# Patient Record
Sex: FEMALE | Race: WHITE | NOT HISPANIC OR LATINO | Employment: FULL TIME | URBAN - METROPOLITAN AREA
[De-identification: names, ages, dates, MRNs, and addresses within clinical notes are randomized per-mention and may not be internally consistent; named-entity substitution may affect disease eponyms.]

---

## 2023-08-31 LAB
C REACTIVE PROTEIN (MG/L) IN SER/PLAS: 0.27 MG/DL
THYROTROPIN (MIU/L) IN SER/PLAS BY DETECTION LIMIT <= 0.05 MIU/L: 1.08 MIU/L (ref 0.44–3.98)
TISSUE TRANSGLUTAMINASE, IGA: <1 U/ML (ref 0–14)

## 2023-09-09 LAB
C. DIFFICILE TOXIN, PCR: NOT DETECTED
CAMPYLOBACTER GP: NOT DETECTED
NOROVIRUS GI/GII: NOT DETECTED
ROTAVIRUS A: NOT DETECTED
SALMONELLA SP.: NOT DETECTED
SHIGA TOXIN 1: NOT DETECTED
SHIGA TOXIN 2: NOT DETECTED
SHIGELLA SP.: NOT DETECTED
VIBRIO GRP.: NOT DETECTED
YERSINIA ENTEROCOLITICA: NOT DETECTED

## 2023-09-13 LAB — CALPROTECTIN, STOOL: 152 UG/G

## 2023-10-17 ENCOUNTER — ANESTHESIA EVENT (OUTPATIENT)
Dept: GASTROENTEROLOGY | Facility: EXTERNAL LOCATION | Age: 23
End: 2023-10-17

## 2023-10-26 PROBLEM — K60.2 ANAL FISSURE: Status: ACTIVE | Noted: 2023-10-26

## 2023-10-26 PROBLEM — K21.9 GERD (GASTROESOPHAGEAL REFLUX DISEASE): Status: ACTIVE | Noted: 2023-10-26

## 2023-10-26 PROBLEM — K52.9 CHRONIC DIARRHEA OF UNKNOWN ORIGIN: Status: ACTIVE | Noted: 2023-10-26

## 2023-10-26 RX ORDER — AMOXICILLIN 250 MG
1 CAPSULE ORAL AS NEEDED
COMMUNITY

## 2023-10-26 RX ORDER — FAMOTIDINE 20 MG/1
20 TABLET, FILM COATED ORAL AS NEEDED
COMMUNITY
End: 2023-10-27 | Stop reason: ALTCHOICE

## 2023-10-26 RX ORDER — DICYCLOMINE HYDROCHLORIDE 10 MG/1
10 CAPSULE ORAL AS NEEDED
COMMUNITY

## 2023-10-26 RX ORDER — ONDANSETRON 4 MG/1
4 TABLET, ORALLY DISINTEGRATING ORAL EVERY 8 HOURS PRN
COMMUNITY

## 2023-10-26 RX ORDER — LEVONORGESTREL AND ETHINYL ESTRADIOL 0.15-0.03
1 KIT ORAL DAILY
COMMUNITY

## 2023-10-26 RX ORDER — OMEPRAZOLE 20 MG/1
20 CAPSULE, DELAYED RELEASE ORAL DAILY
COMMUNITY

## 2023-10-27 ENCOUNTER — ANESTHESIA (OUTPATIENT)
Dept: GASTROENTEROLOGY | Facility: EXTERNAL LOCATION | Age: 23
End: 2023-10-27

## 2023-10-27 ENCOUNTER — HOSPITAL ENCOUNTER (OUTPATIENT)
Dept: GASTROENTEROLOGY | Facility: EXTERNAL LOCATION | Age: 23
Discharge: HOME | End: 2023-10-27
Payer: COMMERCIAL

## 2023-10-27 ENCOUNTER — PREP FOR PROCEDURE (OUTPATIENT)
Dept: GASTROENTEROLOGY | Facility: EXTERNAL LOCATION | Age: 23
End: 2023-10-27

## 2023-10-27 VITALS
SYSTOLIC BLOOD PRESSURE: 118 MMHG | HEIGHT: 60 IN | RESPIRATION RATE: 17 BRPM | TEMPERATURE: 98.1 F | WEIGHT: 125 LBS | HEART RATE: 79 BPM | OXYGEN SATURATION: 100 % | DIASTOLIC BLOOD PRESSURE: 78 MMHG | BODY MASS INDEX: 24.54 KG/M2

## 2023-10-27 DIAGNOSIS — R10.13 EPIGASTRIC PAIN: Primary | ICD-10-CM

## 2023-10-27 DIAGNOSIS — K52.9 NONINFECTIVE GASTROENTERITIS AND COLITIS, UNSPECIFIED: ICD-10-CM

## 2023-10-27 DIAGNOSIS — R11.0 NAUSEA: ICD-10-CM

## 2023-10-27 DIAGNOSIS — R19.7 DIARRHEA, UNSPECIFIED TYPE: ICD-10-CM

## 2023-10-27 DIAGNOSIS — K21.9 GASTRO-ESOPHAGEAL REFLUX DISEASE WITHOUT ESOPHAGITIS: ICD-10-CM

## 2023-10-27 LAB — PREGNANCY TEST URINE, POC: NEGATIVE

## 2023-10-27 PROCEDURE — 45378 DIAGNOSTIC COLONOSCOPY: CPT | Performed by: INTERNAL MEDICINE

## 2023-10-27 PROCEDURE — 88305 TISSUE EXAM BY PATHOLOGIST: CPT | Performed by: PATHOLOGY

## 2023-10-27 PROCEDURE — 43239 EGD BIOPSY SINGLE/MULTIPLE: CPT | Performed by: INTERNAL MEDICINE

## 2023-10-27 PROCEDURE — 88305 TISSUE EXAM BY PATHOLOGIST: CPT

## 2023-10-27 RX ORDER — SODIUM CHLORIDE 9 MG/ML
20 INJECTION, SOLUTION INTRAVENOUS CONTINUOUS
Status: CANCELLED | OUTPATIENT
Start: 2023-10-27

## 2023-10-27 RX ORDER — SODIUM CHLORIDE 9 MG/ML
20 INJECTION, SOLUTION INTRAVENOUS CONTINUOUS
Status: DISCONTINUED | OUTPATIENT
Start: 2023-10-27 | End: 2023-10-28 | Stop reason: HOSPADM

## 2023-10-27 RX ORDER — PROPOFOL 10 MG/ML
INJECTION, EMULSION INTRAVENOUS AS NEEDED
Status: DISCONTINUED | OUTPATIENT
Start: 2023-10-27 | End: 2023-10-27

## 2023-10-27 RX ORDER — LIDOCAINE HYDROCHLORIDE 20 MG/ML
INJECTION, SOLUTION INFILTRATION; PERINEURAL AS NEEDED
Status: DISCONTINUED | OUTPATIENT
Start: 2023-10-27 | End: 2023-10-27

## 2023-10-27 RX ADMIN — PROPOFOL 50 MG: 10 INJECTION, EMULSION INTRAVENOUS at 10:44

## 2023-10-27 RX ADMIN — PROPOFOL 50 MG: 10 INJECTION, EMULSION INTRAVENOUS at 10:42

## 2023-10-27 RX ADMIN — SODIUM CHLORIDE: 9 INJECTION, SOLUTION INTRAVENOUS at 10:35

## 2023-10-27 RX ADMIN — LIDOCAINE HYDROCHLORIDE 5 ML: 20 INJECTION, SOLUTION INFILTRATION; PERINEURAL at 10:40

## 2023-10-27 RX ADMIN — PROPOFOL 50 MG: 10 INJECTION, EMULSION INTRAVENOUS at 10:47

## 2023-10-27 RX ADMIN — PROPOFOL 50 MG: 10 INJECTION, EMULSION INTRAVENOUS at 10:56

## 2023-10-27 RX ADMIN — PROPOFOL 50 MG: 10 INJECTION, EMULSION INTRAVENOUS at 10:50

## 2023-10-27 RX ADMIN — PROPOFOL 50 MG: 10 INJECTION, EMULSION INTRAVENOUS at 10:40

## 2023-10-27 SDOH — HEALTH STABILITY: MENTAL HEALTH: CURRENT SMOKER: 0

## 2023-10-27 ASSESSMENT — PAIN - FUNCTIONAL ASSESSMENT
PAIN_FUNCTIONAL_ASSESSMENT: 0-10

## 2023-10-27 ASSESSMENT — PAIN SCALES - GENERAL
PAINLEVEL_OUTOF10: 0 - NO PAIN
PAIN_LEVEL: 0

## 2023-10-27 ASSESSMENT — COLUMBIA-SUICIDE SEVERITY RATING SCALE - C-SSRS
6. HAVE YOU EVER DONE ANYTHING, STARTED TO DO ANYTHING, OR PREPARED TO DO ANYTHING TO END YOUR LIFE?: NO
1. IN THE PAST MONTH, HAVE YOU WISHED YOU WERE DEAD OR WISHED YOU COULD GO TO SLEEP AND NOT WAKE UP?: NO
2. HAVE YOU ACTUALLY HAD ANY THOUGHTS OF KILLING YOURSELF?: NO

## 2023-10-27 NOTE — ANESTHESIA PREPROCEDURE EVALUATION
Patient: Yen Castellanos    Procedure Information       Date/Time: 10/27/23 1020    Scheduled providers: Willam Nicole MD; Maggie Vazquez RN    Procedures:       COLONOSCOPY      EGD    Location: [facility]            Relevant Problems   GI   (+) Chronic diarrhea of unknown origin   (+) GERD (gastroesophageal reflux disease)       Clinical information reviewed:   Tobacco  Allergies  Meds   Med Hx  Surg Hx  OB Status  Fam Hx  Soc   Hx        NPO Detail:  NPO/Void Status  Carbonhydrate Drink Given Prior to Surgery? : N  Date of Last Liquid: 10/27/23  Time of Last Liquid: 0445  Date of Last Solid: 10/25/23  Time of Last Solid: 2100  Last Intake Type: Clear fluids  Time of Last Void: 1017         Physical Exam    Airway  Mallampati: II  TM distance: >3 FB  Neck ROM: full     Cardiovascular - normal exam  Rhythm: regular  Rate: normal     Dental - normal exam     Pulmonary - normal exam  Breath sounds clear to auscultation     Abdominal - normal exam         Anesthesia Plan    ASA 2     MAC     The patient is not a current smoker.    intravenous induction   Anesthetic plan and risks discussed with patient.  Use of blood products discussed with who consented to blood products.    Plan discussed with CRNA.

## 2023-10-27 NOTE — Clinical Note
Patient tolerated the procedure well and is comfortable with no complaints of pain. Abdomen soft and non distended. Vital signs stable. Arousable prior to transport. Patient transported to PACU via stretcher. Handoff completed.

## 2023-10-27 NOTE — H&P
Procedure H&P    Patient Profile-Procedures  Name Yen Gunter  Date of Birth 2000  MRN [id number]  Address   [address] [place] [id number] [place] [address]    Primary Phone Number [phone]  Secondary Phone Number    [facility] Calvin, Debbie Lynn    Procedure(s):  Procedures: EGD and Colonoscopy  Primary contact name and number   Extended Emergency Contact Information  Primary Emergency Contact: gwyn gunter  Work Phone: [phone]  Relation: Spouse  Preferred language: English   needed? No    General Health  Weight   Vitals:    10/27/23 1015   Weight: 56.7 kg (125 lb)     BMI Body mass index is 24.41 kg/m².    Allergies  Allergies   Allergen Reactions    Codeine Unknown    Penicillins Unknown    Sutab [Sod Sulf-Pot Chloride-Mag Sulf] Hives and Itching       Past Medical History   Past Medical History:   Diagnosis Date    Congenital adrenal hyperplasia (CMS/HCC)     GERD (gastroesophageal reflux disease)        Provider assessment  Diagnosis:  nausea abdominal pain diarrhea  and GERD  Medication Reviewed - yes  Prior to Admission medications    Medication Sig Start Date End Date Taking? Authorizing Provider   omeprazole (PriLOSEC) 20 mg DR capsule Take 1 capsule (20 mg) by mouth once daily.   Yes Historical Provider, MD   dicyclomine (Bentyl) 10 mg capsule Take 1 capsule (10 mg) by mouth if needed.    Historical Provider, MD   levonorgestreL-ethinyl estrad (Seasonale) 0.15 mg-30 mcg (91) tablet Take 1 tablet by mouth once daily.    Historical Provider, MD   NON FORMULARY Compound drug- Nifedipine ointment .2% with lidocaine.  Apply to affected area twice a day for 4 weeks    Historical Provider, MD   ondansetron ODT (Zofran-ODT) 4 mg disintegrating tablet Take 1 tablet (4 mg) by mouth every 8 hours if needed.    Historical Provider, MD   sennosides-docusate sodium (Senna-S) 8.6-50 mg tablet Take 1 tablet by mouth if needed for constipation.    Historical Provider, MD   sod  sulf-pot chloride-mag sulf 1.479-0.188- 0.225 gram tablet Take as directed    Historical Provider, MD   famotidine (Pepcid) 20 mg tablet Take 1 tablet (20 mg) by mouth if needed for heartburn.  10/27/23  Historical Provider, MD       Physical Exam  Vitals:    10/27/23 1015   BP: 138/89   Pulse: 93   Resp: 13   Temp: 36.2 °C (97.2 °F)   SpO2: 99%        General: A&Ox3, NAD.  HEENT: AT/NC.   CV: RRR. No murmur.  Resp: CTA bilaterally. No wheezing, rhonchi or rales.   GI: Soft, NT/ND. BSx4.  Extrem: No edema. Pulses intact.  Skin: No Jaundice.   Neuro: No focal deficits.   Psych: Normal mood and affect.      Procedure Plan - pre-procedural (re)assesment completed by physician:  discharge/transfer patient when discharge criteria met    Willam Nicole MD  10/27/2023 10:37 AM

## 2023-10-27 NOTE — DISCHARGE INSTRUCTIONS
Patient Instructions after a Colonoscopy    The anesthetics, sedatives or narcotics which were given to you today will be acting in your body for the next 24 hours, so you might feel a little sleepy or groggy.  This feeling should slowly wear off. Carefully read and follow the instructions.     You received sedation today:  - Do not drive or operate any machinery or power tools of any kind.   - No alcoholic beverages today, not even beer or wine.  - Do not make any important decisions or sign any legal documents.  - No over the counter medications that contain alcohol or that may cause drowsiness.    While it is common to experience mild to moderate abdominal distention, gas, or belching after your procedure, if any of these symptoms occur following discharge from the GI Lab or within one week of having your procedure, call the [facility] to be advised whether a visit to your nearest Urgent Care or Emergency Department is indicated.  Take this paper with you if you go.     - If you develop an allergic reaction to the medications that were given during your procedure such as difficulty breathing, rash, hives, severe nausea, vomiting or lightheadedness.  - If you experience chest pain, shortness of breath, severe abdominal pain, fevers and chills.  -If you develop signs and symptoms of bleeding such as blood in your spit, if your stools turn black, tarry, or bloody  - If you have not urinated within 8 hours following your procedure.  - If your IV site becomes painful, red, inflamed, or looks infected.    If you received a biopsy/polypectomy/sphincterotomy the following instructions apply below:    __ Do not use Aspirin containing products, non-steroidal medications or anti-coagulants for one week following your procedure. (Examples of these types of medications are: Advil, Arthrotec, Aleve, Coumadin, Ecotrin, Heparin, Ibuprofen, Indocin, Motrin, Naprosyn, Nuprin, Plavix, Vioxx, and Voltarin, or their  generic forms.  This list is not all-inclusive.  Check with your physician or pharmacist before resuming medications.)   __ Eat a soft diet today.  Avoid foods that are poorly digested for the next 24 hours.  These foods would include: nuts, beans, lettuce, red meats, and fried foods. Start with liquids and advance your diet as tolerated, gradually work up to eating solids.   __ Do not have a Barium Study or Enema for one week.    Your physician recommends the additional following instructions:    -You have a contact number available for emergencies. The signs and symptoms of potential delayed complications were discussed with you. You may return to normal activities tomorrow.  -Resume your previous diet.  -Continue your present medications.   -We are waiting for your pathology results.  -Your physician has recommended a repeat colonoscopy (date to be determined after pending pathology results are reviewed) for surveillance based on pathology results.  -The findings and recommendations have been discussed with you.  -The findings and recommendations were discussed with your family.  - Please see Medication Reconciliation Form for new medication/medications prescribed.     If you experience any problems or have any questions following discharge from the GI Lab, please call: [phone]

## 2023-11-13 ENCOUNTER — TELEPHONE (OUTPATIENT)
Dept: GASTROENTEROLOGY | Facility: HOSPITAL | Age: 23
End: 2023-11-13
Payer: COMMERCIAL

## 2023-11-16 LAB
LABORATORY COMMENT REPORT: NORMAL
PATH REPORT.FINAL DX SPEC: NORMAL
PATH REPORT.GROSS SPEC: NORMAL
PATH REPORT.RELEVANT HX SPEC: NORMAL
PATH REPORT.TOTAL CANCER: NORMAL

## 2023-11-20 ENCOUNTER — OFFICE VISIT (OUTPATIENT)
Dept: GASTROENTEROLOGY | Facility: CLINIC | Age: 23
End: 2023-11-20
Payer: COMMERCIAL

## 2023-11-20 VITALS
DIASTOLIC BLOOD PRESSURE: 85 MMHG | HEART RATE: 84 BPM | TEMPERATURE: 97.4 F | SYSTOLIC BLOOD PRESSURE: 136 MMHG | BODY MASS INDEX: 25.72 KG/M2 | WEIGHT: 131 LBS | HEIGHT: 60 IN

## 2023-11-20 DIAGNOSIS — K21.9 GASTROESOPHAGEAL REFLUX DISEASE, UNSPECIFIED WHETHER ESOPHAGITIS PRESENT: Primary | ICD-10-CM

## 2023-11-20 DIAGNOSIS — K52.9 CHRONIC DIARRHEA: ICD-10-CM

## 2023-11-20 PROCEDURE — 1036F TOBACCO NON-USER: CPT | Performed by: NURSE PRACTITIONER

## 2023-11-20 PROCEDURE — 99214 OFFICE O/P EST MOD 30 MIN: CPT | Performed by: NURSE PRACTITIONER

## 2023-11-20 RX ORDER — FAMOTIDINE 40 MG/1
40 TABLET, FILM COATED ORAL NIGHTLY PRN
Qty: 60 TABLET | Refills: 0 | Status: SHIPPED | OUTPATIENT
Start: 2023-11-20 | End: 2024-01-19

## 2023-11-20 ASSESSMENT — ENCOUNTER SYMPTOMS
EYES NEGATIVE: 1
PSYCHIATRIC NEGATIVE: 1
HEMATOLOGIC/LYMPHATIC NEGATIVE: 1
NEUROLOGICAL NEGATIVE: 1
CARDIOVASCULAR NEGATIVE: 1
DIAPHORESIS: 0
MUSCULOSKELETAL NEGATIVE: 1
ROS GI COMMENTS: SEE HPI
DIFFICULTY URINATING: 0
FEVER: 0
STRIDOR: 0
CHEST TIGHTNESS: 0
SHORTNESS OF BREATH: 0
ENDOCRINE NEGATIVE: 1
RESPIRATORY NEGATIVE: 1
WHEEZING: 0
ALLERGIC/IMMUNOLOGIC NEGATIVE: 1
COUGH: 0
APNEA: 0
FATIGUE: 0
CHILLS: 0

## 2023-11-20 NOTE — PROGRESS NOTES
Subjective   Patient ID: Yen Castellanos is a 23 y.o. female who presents for Results.      SHe is having a BM 2-3 times/day, mostly in the morning or late at night. Never wakes from sleep. She denies rectal bleeding, no dark tarry stools. SHe reports pain with a BM. Family Hx: P Uncle with CrohnsP. Grandfather with CRC @ 60sP. Uncle with CRC @ 50sShe denies a family hx of Celiac         Review of Systems   Constitutional:  Negative for chills, diaphoresis, fatigue and fever.   HENT: Negative.     Eyes: Negative.    Respiratory: Negative.  Negative for apnea, cough, chest tightness, shortness of breath, wheezing and stridor.    Cardiovascular: Negative.    Gastrointestinal:         See HPI    Endocrine: Negative.    Genitourinary: Negative.  Negative for difficulty urinating.   Musculoskeletal: Negative.    Skin: Negative.    Allergic/Immunologic: Negative.    Neurological: Negative.    Hematological: Negative.    Psychiatric/Behavioral: Negative.         Objective   Physical Exam  Constitutional:       Appearance: She is normal weight.   Neurological:      Mental Status: She is alert.   Psychiatric:         Mood and Affect: Mood normal.         Assessment/Plan   Diagnoses and all orders for this visit:  Gastroesophageal reflux disease, unspecified whether esophagitis present  -     famotidine (Pepcid) 40 mg tablet; Take 1 tablet (40 mg) by mouth as needed at bedtime for heartburn.  Chronic diarrhea  -     Breath Hydrogen Test-Bacterial Overgrowth; Future  -     Calprotectin, Fecal; Future    T his is a 22 year old female with no significant PMH who presents today for follow up procedures. She underwent an EGD and Colonoscopy. EGD showed peptic duodenitis. She is doing well with Omeprazole but does have reflux at HS. She still has diarrhea. Fecal calprotectin was slightly elevated. She will recheck this and if remains elevated will complete a CTE, if normal she will complete breath test to r/o SIBO.

## 2023-11-20 NOTE — PATIENT INSTRUCTIONS
Complete stool test    Add famotidine at bedtime    Call if still having symptoms    Check stool test    We will either do a breath test or CT scan    Follow up in 1-2 months (virtual)

## 2024-04-03 ENCOUNTER — LAB (OUTPATIENT)
Dept: LAB | Facility: LAB | Age: 24
End: 2024-04-03
Payer: COMMERCIAL

## 2024-04-03 DIAGNOSIS — K52.9 CHRONIC DIARRHEA: ICD-10-CM

## 2024-04-03 PROCEDURE — 83993 ASSAY FOR CALPROTECTIN FECAL: CPT

## 2024-04-07 LAB — CALPROTECTIN STL-MCNT: 52 UG/G

## 2024-08-21 ENCOUNTER — TELEMEDICINE (OUTPATIENT)
Dept: PRIMARY CARE | Facility: CLINIC | Age: 24
End: 2024-08-21
Payer: COMMERCIAL

## 2024-08-21 DIAGNOSIS — R05.2 SUBACUTE COUGH: ICD-10-CM

## 2024-08-21 DIAGNOSIS — J06.9 UPPER RESPIRATORY TRACT INFECTION, UNSPECIFIED TYPE: Primary | ICD-10-CM

## 2024-08-21 PROCEDURE — 99213 OFFICE O/P EST LOW 20 MIN: CPT

## 2024-08-21 RX ORDER — BENZONATATE 100 MG/1
100 CAPSULE ORAL 3 TIMES DAILY PRN
Qty: 42 CAPSULE | Refills: 0 | Status: SHIPPED | OUTPATIENT
Start: 2024-08-21 | End: 2024-09-20

## 2024-08-21 RX ORDER — AZITHROMYCIN 500 MG/1
500 TABLET, FILM COATED ORAL DAILY
Qty: 5 TABLET | Refills: 0 | Status: SHIPPED | OUTPATIENT
Start: 2024-08-21 | End: 2024-08-26

## 2024-08-21 RX ORDER — ALBUTEROL SULFATE 90 UG/1
2 INHALANT RESPIRATORY (INHALATION) EVERY 4 HOURS PRN
Qty: 8.5 G | Refills: 0 | Status: SHIPPED | OUTPATIENT
Start: 2024-08-21 | End: 2025-08-21

## 2024-08-21 ASSESSMENT — ENCOUNTER SYMPTOMS
SORE THROAT: 0
WHEEZING: 1
COUGH: 1
RHINORRHEA: 1
FEVER: 1
SHORTNESS OF BREATH: 1

## 2024-08-21 ASSESSMENT — COPD QUESTIONNAIRES: COPD: 0

## 2024-08-21 NOTE — PROGRESS NOTES
On Demand Virtual Visit Patient Consent     This visit was completed via video conference. All issues as below were discussed and addressed but no physical exam was performed. If it was felt that the patient should be evaluated in clinic than they were directed there. The patient verbally consented to the visit.    An interactive audio and video telecommunication system which permits real time communications between the patient (at the originating site) and provider (at the distant site) was utilized to provide this telehealth service.   Verbal consent was requested and obtained from Yen Castellanos (or parent if under 18) 08/21/24 for a telehealth visit.   I have verbally confirmed with Yen Castellanos (or parent if under 18) that they are physically located in the [facility] during this virtual visit.    Subjective   Patient ID: Yen Castellanos is a 23 y.o. female who presents for Cough.    Cough  This is a new problem. Episode onset: 5days ago. The problem has been gradually worsening. The problem occurs every few minutes. The cough is Productive of purulent sputum. Associated symptoms include a fever, nasal congestion, rhinorrhea, shortness of breath and wheezing. Pertinent negatives include no ear congestion or sore throat. Associated symptoms comments: Tmax 102 since Friday, cough productive of yellow green mucus  . Nothing aggravates the symptoms. She has tried a beta-agonist inhaler, OTC cough suppressant and rest for the symptoms. The treatment provided mild relief. There is no history of asthma, COPD or emphysema.        Review of Systems   Constitutional:  Positive for fever.   HENT:  Positive for rhinorrhea. Negative for sore throat.    Respiratory:  Positive for cough, shortness of breath and wheezing.        Objective   There were no vitals taken for this visit.    Physical Exam pt seen via video feed to be in no acute distress    Assessment/Plan   Yen was seen today for  cough.  Diagnoses and all orders for this visit:  Upper respiratory tract infection, unspecified type  -     azithromycin (Zithromax) 500 mg tablet; Take 1 tablet (500 mg) by mouth once daily for 5 days.  -     albuterol (ProAir HFA) 90 mcg/actuation inhaler; Inhale 2 puffs every 4 hours if needed for wheezing or shortness of breath.  Subacute cough  -     benzonatate (Tessalon) 100 mg capsule; Take 1 capsule (100 mg) by mouth 3 times a day as needed for cough. Do not crush or chew.  -     albuterol (ProAir HFA) 90 mcg/actuation inhaler; Inhale 2 puffs every 4 hours if needed for wheezing or shortness of breath.

## 2024-08-21 NOTE — PATIENT INSTRUCTIONS
Drinking plenty of fluids and getting lots of rest. Chicken soup and hot beverages may help.    Trial of nasal irrigation with a Nettipot or squeeze bottle with sterile salt water.    Nasal spray corticosteroids (Flonase) may help in reducing the inflammatory response in the nasal passages and airways. Please try 2 sprays each nostril daily for 2 weeks.  If you have season allergies, please take a daily antihistamine of your choice, such as Zyrtec/Claritin/Allegra at bedtime.    Take Tylenol or Motrin/Aleve for sinus or ear pain.    If you have good blood pressure you can try pseudofed (you must ask the pharmacist for it and show ID).    Please call or return to the office if you are not feeling better in the next 3-4 days after starting treatment.    Over-the-counter cold and cough medications     Take Mucinex for cough, drink plenty of fluids with this medication and will help break up congestion making it easier to cough up     For cough can use honey (children ages 1 and up) in hot tea or hot water. I recommend putting this in an insulated cup and carrying it around throughout the day to sip on.  Have it at your bedside at night in case you wake up coughing.  You can also use honey cough drops (adults and older children).     Recommend nasal saline for use in children and adults.  Neti Ford can also be helpful.  (Never used tap water and a Neti pot.  Use distilled water.)     If you have plugged up congested ears or the feeling of fluid in your ears, you can use an over-the-counter nasal steroid spray like fluticasone (brand name Flonase) use 2 sprays into each nostril once or twice a day for 7 days.  This will help open up the eustachian tubes and allow the fluid to drain out of your ears.     Sleeping with your head/chest elevated can help with sinus drainage.     Adults only-can use nasal decongestant (like Afrin) at bedtime to open nasal passages so you can breathe through your nose while you sleep; avoid  using for longer than 3 days as this medication can become addicting.  Do not use if you have high blood pressure or high heart rate.     For severe pain or fever in adult-Tylenol (2 extra strength) or ibuprofen (3-4 tabs equals 600 to 800 mg) alternating as needed for pain.  Tylenol doses should be 6 to 8 hours apart and ibuprofen doses should be 6 to 8 hours apart.        Common cold medications for adults explained:     Mucinex-(generic name guaifenesin)-is an expectorant.  This will thin out all the thick mucus.  Must drink plenty of liquids for this medicine to work.     Dextromethorphan (brand name Delsym or DM)-this medicine is a cough suppressant     Honey in hot water or tea-this is a natural cough suppressant     Decongestant nasal spray- (eg: Afrin) use for temporary relief of nasal congestion-best when used at bedtime to open up nasal passages so that you are not forced to mouth breathe overnight.     Sudafed (generic name pseudoephedrine-this must be bought from the pharmacist) DO NOT use this medicine if you have high blood pressure as it can raise your blood pressure higher.  Do not use if you have any irregular heart rate.  This medicine can help clear congestion in your sinuses.

## 2024-09-25 ENCOUNTER — APPOINTMENT (OUTPATIENT)
Dept: GASTROENTEROLOGY | Facility: CLINIC | Age: 24
End: 2024-09-25
Payer: COMMERCIAL